# Patient Record
Sex: MALE | Race: WHITE | NOT HISPANIC OR LATINO | Employment: OTHER | URBAN - NONMETROPOLITAN AREA
[De-identification: names, ages, dates, MRNs, and addresses within clinical notes are randomized per-mention and may not be internally consistent; named-entity substitution may affect disease eponyms.]

---

## 2018-05-25 PROCEDURE — 99284 EMERGENCY DEPT VISIT MOD MDM: CPT

## 2018-05-25 PROCEDURE — 93005 ELECTROCARDIOGRAM TRACING: CPT

## 2018-05-25 PROCEDURE — 99284 EMERGENCY DEPT VISIT MOD MDM: CPT | Mod: Z6 | Performed by: FAMILY MEDICINE

## 2018-05-26 ENCOUNTER — APPOINTMENT (OUTPATIENT)
Dept: GENERAL RADIOLOGY | Facility: HOSPITAL | Age: 73
End: 2018-05-26
Attending: FAMILY MEDICINE
Payer: COMMERCIAL

## 2018-05-26 ENCOUNTER — TRANSFERRED RECORDS (OUTPATIENT)
Dept: HEALTH INFORMATION MANAGEMENT | Facility: CLINIC | Age: 73
End: 2018-05-26

## 2018-05-26 ENCOUNTER — HOSPITAL ENCOUNTER (EMERGENCY)
Facility: HOSPITAL | Age: 73
Discharge: HOME | End: 2018-05-26
Attending: FAMILY MEDICINE | Admitting: FAMILY MEDICINE
Payer: COMMERCIAL

## 2018-05-26 VITALS
TEMPERATURE: 97 F | RESPIRATION RATE: 14 BRPM | DIASTOLIC BLOOD PRESSURE: 75 MMHG | SYSTOLIC BLOOD PRESSURE: 145 MMHG | OXYGEN SATURATION: 95 % | HEART RATE: 88 BPM

## 2018-05-26 DIAGNOSIS — S82.201A TIBIA/FIBULA FRACTURE, RIGHT, CLOSED, INITIAL ENCOUNTER: ICD-10-CM

## 2018-05-26 DIAGNOSIS — S82.402A TIBIA/FIBULA FRACTURE, LEFT, CLOSED, INITIAL ENCOUNTER: ICD-10-CM

## 2018-05-26 DIAGNOSIS — S82.401A TIBIA/FIBULA FRACTURE, RIGHT, CLOSED, INITIAL ENCOUNTER: ICD-10-CM

## 2018-05-26 DIAGNOSIS — S82.202A TIBIA/FIBULA FRACTURE, LEFT, CLOSED, INITIAL ENCOUNTER: ICD-10-CM

## 2018-05-26 LAB
ANION GAP SERPL CALCULATED.3IONS-SCNC: 7 MMOL/L (ref 3–14)
BASOPHILS # BLD AUTO: 0.1 10E9/L (ref 0–0.2)
BASOPHILS NFR BLD AUTO: 0.6 %
BUN SERPL-MCNC: 21 MG/DL (ref 7–30)
CALCIUM SERPL-MCNC: 8.9 MG/DL (ref 8.5–10.1)
CHLORIDE SERPL-SCNC: 104 MMOL/L (ref 94–109)
CO2 SERPL-SCNC: 26 MMOL/L (ref 20–32)
CREAT SERPL-MCNC: 0.79 MG/DL (ref 0.66–1.25)
DIFFERENTIAL METHOD BLD: NORMAL
EOSINOPHIL # BLD AUTO: 0.3 10E9/L (ref 0–0.7)
EOSINOPHIL NFR BLD AUTO: 2.6 %
ERYTHROCYTE [DISTWIDTH] IN BLOOD BY AUTOMATED COUNT: 13.2 % (ref 10–15)
GFR SERPL CREATININE-BSD FRML MDRD: >90 ML/MIN/1.7M2
GLUCOSE SERPL-MCNC: 106 MG/DL (ref 70–99)
HCT VFR BLD AUTO: 45.5 % (ref 40–53)
HGB BLD-MCNC: 15.6 G/DL (ref 13.3–17.7)
IMM GRANULOCYTES # BLD: 0 10E9/L (ref 0–0.4)
IMM GRANULOCYTES NFR BLD: 0.4 %
LYMPHOCYTES # BLD AUTO: 1.8 10E9/L (ref 0.8–5.3)
LYMPHOCYTES NFR BLD AUTO: 19 %
MCH RBC QN AUTO: 33 PG (ref 26.5–33)
MCHC RBC AUTO-ENTMCNC: 34.3 G/DL (ref 31.5–36.5)
MCV RBC AUTO: 96 FL (ref 78–100)
MONOCYTES # BLD AUTO: 0.9 10E9/L (ref 0–1.3)
MONOCYTES NFR BLD AUTO: 9.5 %
NEUTROPHILS # BLD AUTO: 6.6 10E9/L (ref 1.6–8.3)
NEUTROPHILS NFR BLD AUTO: 67.9 %
NRBC # BLD AUTO: 0 10*3/UL
NRBC BLD AUTO-RTO: 0 /100
PLATELET # BLD AUTO: 156 10E9/L (ref 150–450)
POTASSIUM SERPL-SCNC: 4.4 MMOL/L (ref 3.4–5.3)
RBC # BLD AUTO: 4.73 10E12/L (ref 4.4–5.9)
SODIUM SERPL-SCNC: 137 MMOL/L (ref 133–144)
WBC # BLD AUTO: 9.7 10E9/L (ref 4–11)

## 2018-05-26 PROCEDURE — 93005 ELECTROCARDIOGRAM TRACING: CPT

## 2018-05-26 PROCEDURE — 36415 COLL VENOUS BLD VENIPUNCTURE: CPT | Performed by: FAMILY MEDICINE

## 2018-05-26 PROCEDURE — 73610 X-RAY EXAM OF ANKLE: CPT | Mod: 50

## 2018-05-26 PROCEDURE — 85025 COMPLETE CBC W/AUTO DIFF WBC: CPT | Performed by: FAMILY MEDICINE

## 2018-05-26 PROCEDURE — 93010 ELECTROCARDIOGRAM REPORT: CPT | Performed by: INTERNAL MEDICINE

## 2018-05-26 PROCEDURE — 80048 BASIC METABOLIC PNL TOTAL CA: CPT | Performed by: FAMILY MEDICINE

## 2018-05-26 PROCEDURE — 73590 X-RAY EXAM OF LOWER LEG: CPT | Mod: TC,LT

## 2018-05-26 PROCEDURE — 25000132 ZZH RX MED GY IP 250 OP 250 PS 637: Performed by: FAMILY MEDICINE

## 2018-05-26 RX ORDER — ALLOPURINOL 300 MG/1
300 TABLET ORAL DAILY
COMMUNITY
Start: 2018-02-22 | End: 2018-07-09

## 2018-05-26 RX ORDER — TRIAMCINOLONE ACETONIDE 1 MG/G
1 OINTMENT TOPICAL PRN
COMMUNITY
Start: 2018-02-22 | End: 2018-08-15

## 2018-05-26 RX ORDER — NAPROXEN 500 MG/1
500 TABLET ORAL PRN
COMMUNITY
Start: 2016-06-07

## 2018-05-26 RX ORDER — OXYCODONE HYDROCHLORIDE 5 MG/1
5 TABLET ORAL ONCE
Status: COMPLETED | OUTPATIENT
Start: 2018-05-26 | End: 2018-05-26

## 2018-05-26 RX ORDER — DIPHENOXYLATE HYDROCHLORIDE AND ATROPINE SULFATE 2.5; .025 MG/1; MG/1
1 TABLET ORAL DAILY
COMMUNITY

## 2018-05-26 RX ADMIN — OXYCODONE HYDROCHLORIDE 5 MG: 5 TABLET ORAL at 03:13

## 2018-05-26 ASSESSMENT — ENCOUNTER SYMPTOMS
NEUROLOGICAL NEGATIVE: 1
PSYCHIATRIC NEGATIVE: 1
GASTROINTESTINAL NEGATIVE: 1
CONSTITUTIONAL NEGATIVE: 1
HEMATOLOGIC/LYMPHATIC NEGATIVE: 1
RESPIRATORY NEGATIVE: 1
NECK PAIN: 0
CARDIOVASCULAR NEGATIVE: 1
MYALGIAS: 0
NECK STIFFNESS: 0
BACK PAIN: 0
JOINT SWELLING: 1

## 2018-05-26 NOTE — ED NOTES
Patient to room ED 11 via motorized wheelchair. Patient states that he was making dinner tonight. He elevated his wheelchair, and fell from it. He denies any head/neck injury or LOC. Patient C/O right ankle pain and left lower leg pain. He also states that he has been unable to transfer since the fall. Patient alert and oriented. CMS intact.

## 2018-05-26 NOTE — ED PROVIDER NOTES
History     Chief Complaint   Patient presents with     Ankle Pain     Patient fell forward out of wheelchair at 1830. Patietn denies any LOC or head/neck pain. C/O right ankle pain     Extremity Weakness     Patient unable to transfer since fall     HPI  Saurabh Cabrera is a 73 year old male who presents for leg injury . Patient had been making dinner . Adjusted his wheel chair up for dinner but then when he went to go down the ramp it fell forward. Neighbors had to come and get him back into the wheel chair.   Thinks he bumped his head but no LOC . NO headache. Having leg spasm.   Uses a catheter and self cathed bfore arrival .  Describes pain level 2/10 if not moving but increases to 10 when he develops leg spasms     Problem List:    There are no active problems to display for this patient.       Past Medical History:    No past medical history on file.    Past Surgical History:    No past surgical history on file.    Family History:    No family history on file.    Social History:  Marital Status:   [2]  Social History   Substance Use Topics     Smoking status: Not on file     Smokeless tobacco: Not on file     Alcohol use Not on file        Medications:      No current outpatient prescriptions on file.      Review of Systems   Constitutional: Negative.    HENT: Negative.    Respiratory: Negative.    Cardiovascular: Negative.    Gastrointestinal: Negative.    Genitourinary:        Self caths    Musculoskeletal: Positive for joint swelling. Negative for back pain, myalgias, neck pain and neck stiffness.        Right ankle and left lower leg  Pain    Neurological: Negative.    Hematological: Negative.    Psychiatric/Behavioral: Negative.    All other systems reviewed and are negative.      Physical Exam   BP: 146/80  Pulse: 107  Temp: 96.3  F (35.7  C)  Resp: 20  SpO2: 93 %      Physical Exam   Constitutional: He is oriented to person, place, and time. He appears well-developed and well-nourished. No  distress.   HENT:   Head: Normocephalic.   Eyes: Pupils are equal, round, and reactive to light.   Neck: Normal range of motion. Neck supple.   No  Posterior midline cervical spine tenderness    Cardiovascular: Normal rate and regular rhythm.  Exam reveals no gallop and no friction rub.    No murmur heard.  Pulmonary/Chest: Effort normal and breath sounds normal.   Abdominal: Soft.   Musculoskeletal:   Edema ecchymosis right medial and lateral malleolus. Left leg with ecchymosis tenderness mid shin . Warm extremities distal to injuries . Strong posterior tib pulses    Neurological: He is alert and oriented to person, place, and time.   Skin: Skin is warm. He is not diaphoretic.   Psychiatric: He has a normal mood and affect.   Nursing note and vitals reviewed.      ED Course     ED Course     Procedures          Patient presents to ER after falling forward from his wheel chair. Patient with complaint of bilateral lower extremity pain and inability to transfer. No neck pain No LOC . History and exam completed. Radiographs ordered confirming bilateral fractures including bimalleolar fracture of right ankle and distal tibia and proximal fibular fracture of left. Initially consulted with DR Szymanski orthopedics at our facility who felt patient care more appropriate at higher level of care. Patient discussed with DR Severson [facility] and patient will be transferred for definitive management of fractures. Patient and family in agreement with plan . Patient given oxycodone for pain control prior to transfer.         Results for orders placed or performed during the hospital encounter of 05/26/18   XR Ankle Right G/E 3 Views    Narrative    PROCEDURE:  XR ANKLE RT G/E 3 VW, XR ANKLE LT G/E 3 VW, XR TIBIA  & FIBULA LT 2 VW    HISTORY: injury; .    COMPARISON:  Left ankle 4/23/2009    TECHNIQUE:  3 views of the right ankle, 4 views of the left tibia and  fibula and 3 views of the left ankle.    FINDINGS:  Images of the right  ankle demonstrate an acute distal right  fibular fracture. Lucency traversing the base of the medial malleolus  is concerning for a nondisplaced fracture there as well. There are  diffuse degenerative changes in the hindfoot.     Images of the left tibia and fibula demonstrate oblique fractures of  the mid tibial diaphysis and proximal fibular metadiaphysis. The  distal tibial fragment is displaced medially up to 7.8 mm. The fibular  fracture is displaced laterally up to 4 mm. The bones are  osteoporotic.    Images of the left ankle redemonstrate the tibial fracture. There are  advanced degenerative changes of the left ankle.      Impression    IMPRESSION: Acute fractures of medial and lateral malleoli of the  right ankle as well as the left mid tibial diaphysis and proximal left  fibular metadiaphysis.      IVA CRUZ MD   XR Tibia & Fibula Left 2 Views    Narrative    PROCEDURE:  XR ANKLE RT G/E 3 VW, XR ANKLE LT G/E 3 VW, XR TIBIA  & FIBULA LT 2 VW    HISTORY: injury; .    COMPARISON:  Left ankle 4/23/2009    TECHNIQUE:  3 views of the right ankle, 4 views of the left tibia and  fibula and 3 views of the left ankle.    FINDINGS:  Images of the right ankle demonstrate an acute distal right  fibular fracture. Lucency traversing the base of the medial malleolus  is concerning for a nondisplaced fracture there as well. There are  diffuse degenerative changes in the hindfoot.     Images of the left tibia and fibula demonstrate oblique fractures of  the mid tibial diaphysis and proximal fibular metadiaphysis. The  distal tibial fragment is displaced medially up to 7.8 mm. The fibular  fracture is displaced laterally up to 4 mm. The bones are  osteoporotic.    Images of the left ankle redemonstrate the tibial fracture. There are  advanced degenerative changes of the left ankle.      Impression    IMPRESSION: Acute fractures of medial and lateral malleoli of the  right ankle as well as the left mid tibial  diaphysis and proximal left  fibular metadiaphysis.      IVA CRUZ MD   XR Ankle Left G/E 3 Views    Narrative    PROCEDURE:  XR ANKLE RT G/E 3 VW, XR ANKLE LT G/E 3 VW, XR TIBIA  & FIBULA LT 2 VW    HISTORY: injury; .    COMPARISON:  Left ankle 4/23/2009    TECHNIQUE:  3 views of the right ankle, 4 views of the left tibia and  fibula and 3 views of the left ankle.    FINDINGS:  Images of the right ankle demonstrate an acute distal right  fibular fracture. Lucency traversing the base of the medial malleolus  is concerning for a nondisplaced fracture there as well. There are  diffuse degenerative changes in the hindfoot.     Images of the left tibia and fibula demonstrate oblique fractures of  the mid tibial diaphysis and proximal fibular metadiaphysis. The  distal tibial fragment is displaced medially up to 7.8 mm. The fibular  fracture is displaced laterally up to 4 mm. The bones are  osteoporotic.    Images of the left ankle redemonstrate the tibial fracture. There are  advanced degenerative changes of the left ankle.      Impression    IMPRESSION: Acute fractures of medial and lateral malleoli of the  right ankle as well as the left mid tibial diaphysis and proximal left  fibular metadiaphysis.      IVA CRUZ MD   Basic metabolic panel   Result Value Ref Range    Sodium 137 133 - 144 mmol/L    Potassium 4.4 3.4 - 5.3 mmol/L    Chloride 104 94 - 109 mmol/L    Carbon Dioxide 26 20 - 32 mmol/L    Anion Gap 7 3 - 14 mmol/L    Glucose 106 (H) 70 - 99 mg/dL    Urea Nitrogen 21 7 - 30 mg/dL    Creatinine 0.79 0.66 - 1.25 mg/dL    GFR Estimate >90 >60 mL/min/1.7m2    GFR Estimate If Black >90 >60 mL/min/1.7m2    Calcium 8.9 8.5 - 10.1 mg/dL   CBC with platelets differential   Result Value Ref Range    WBC 9.7 4.0 - 11.0 10e9/L    RBC Count 4.73 4.4 - 5.9 10e12/L    Hemoglobin 15.6 13.3 - 17.7 g/dL    Hematocrit 45.5 40.0 - 53.0 %    MCV 96 78 - 100 fl    MCH 33.0 26.5 - 33.0 pg    MCHC 34.3 31.5 -  36.5 g/dL    RDW 13.2 10.0 - 15.0 %    Platelet Count 156 150 - 450 10e9/L    Diff Method Automated Method     % Neutrophils 67.9 %    % Lymphocytes 19.0 %    % Monocytes 9.5 %    % Eosinophils 2.6 %    % Basophils 0.6 %    % Immature Granulocytes 0.4 %    Nucleated RBCs 0 0 /100    Absolute Neutrophil 6.6 1.6 - 8.3 10e9/L    Absolute Lymphocytes 1.8 0.8 - 5.3 10e9/L    Absolute Monocytes 0.9 0.0 - 1.3 10e9/L    Absolute Eosinophils 0.3 0.0 - 0.7 10e9/L    Absolute Basophils 0.1 0.0 - 0.2 10e9/L    Abs Immature Granulocytes 0.0 0 - 0.4 10e9/L    Absolute Nucleated RBC 0.0          No results found for this or any previous visit (from the past 24 hour(s)).    Medications - No data to display    Assessments & Plan (with Medical Decision Making)     I have reviewed the nursing notes.    I have reviewed the findings, diagnosis, plan and need for follow up with the patient.      New Prescriptions    No medications on file       Final diagnoses:   Tibia/fibula fracture, left, closed, initial encounter   Tibia/fibula fracture, right, closed, initial encounter       5/25/2018   [facility]      Felicia Perera MD  05/26/18 1045

## 2018-06-14 ENCOUNTER — OFFICE VISIT (OUTPATIENT)
Dept: FAMILY MEDICINE | Facility: OTHER | Age: 73
End: 2018-06-14
Attending: FAMILY MEDICINE
Payer: COMMERCIAL

## 2018-06-14 VITALS — DIASTOLIC BLOOD PRESSURE: 68 MMHG | HEART RATE: 80 BPM | SYSTOLIC BLOOD PRESSURE: 122 MMHG

## 2018-06-14 DIAGNOSIS — M62.838 MUSCLE SPASM: Primary | ICD-10-CM

## 2018-06-14 DIAGNOSIS — R33.9 URINARY RETENTION: ICD-10-CM

## 2018-06-14 DIAGNOSIS — S82.402D CLOSED FRACTURE OF LEFT TIBIA AND FIBULA WITH ROUTINE HEALING: ICD-10-CM

## 2018-06-14 DIAGNOSIS — R35.0 URINARY FREQUENCY: ICD-10-CM

## 2018-06-14 DIAGNOSIS — S82.841D CLOSED BIMALLEOLAR FRACTURE OF RIGHT ANKLE WITH ROUTINE HEALING: ICD-10-CM

## 2018-06-14 DIAGNOSIS — S82.202D CLOSED FRACTURE OF LEFT TIBIA AND FIBULA WITH ROUTINE HEALING: ICD-10-CM

## 2018-06-14 LAB
ALBUMIN UR-MCNC: ABNORMAL MG/DL
APPEARANCE UR: ABNORMAL
BACTERIA #/AREA URNS HPF: ABNORMAL /HPF
BILIRUB UR QL STRIP: NEGATIVE
COLOR UR AUTO: YELLOW
GLUCOSE UR STRIP-MCNC: NEGATIVE MG/DL
HGB UR QL STRIP: ABNORMAL
KETONES UR STRIP-MCNC: NEGATIVE MG/DL
LEUKOCYTE ESTERASE UR QL STRIP: ABNORMAL
NITRATE UR QL: POSITIVE
PH UR STRIP: 5.5 PH (ref 5–7)
RBC #/AREA URNS AUTO: ABNORMAL /HPF
SOURCE: ABNORMAL
SP GR UR STRIP: >1.03 (ref 1–1.03)
UROBILINOGEN UR STRIP-ACNC: 0.2 EU/DL (ref 0.2–1)
WBC #/AREA URNS AUTO: ABNORMAL /HPF

## 2018-06-14 PROCEDURE — 81001 URINALYSIS AUTO W/SCOPE: CPT | Performed by: FAMILY MEDICINE

## 2018-06-14 PROCEDURE — 87086 URINE CULTURE/COLONY COUNT: CPT | Performed by: FAMILY MEDICINE

## 2018-06-14 PROCEDURE — 99205 OFFICE O/P NEW HI 60 MIN: CPT | Performed by: FAMILY MEDICINE

## 2018-06-14 PROCEDURE — 87088 URINE BACTERIA CULTURE: CPT | Performed by: FAMILY MEDICINE

## 2018-06-14 RX ORDER — CYCLOBENZAPRINE HCL 10 MG
10 TABLET ORAL 3 TIMES DAILY PRN
Qty: 30 TABLET | Refills: 1 | Status: SHIPPED | OUTPATIENT
Start: 2018-06-14 | End: 2018-07-09

## 2018-06-14 ASSESSMENT — PAIN SCALES - GENERAL: PAINLEVEL: MILD PAIN (3)

## 2018-06-14 NOTE — NURSING NOTE
Patient here to establish care, He is at [facility] follow up. He is from the [facility] [place] [facility].  He has 2 broken legs one with 48 staples. [facility]o follow up. Tressa Schmitt LPN .......................6/14/2018  4:18 PM

## 2018-06-14 NOTE — MR AVS SNAPSHOT
"              After Visit Summary   6/14/2018    Saurabh Cabrera    MRN: [id number]           Patient Information     Date Of Birth          1945        Visit Information        Provider Department      6/14/2018 4:15 PM Joselo Marley MD [facility]        Today's Diagnoses     Muscle spasm    -  1    Urinary frequency        Urinary retention        Closed fracture of left tibia and fibula with routine healing        Closed bimalleolar fracture of right ankle with routine healing           Follow-ups after your visit        Who to contact     If you have questions or need follow up information about today's clinic visit or your schedule please contact [facility] directly at [phone].  Normal or non-critical lab and imaging results will be communicated to you by [employer], letter or phone within 4 business days after the clinic has received the results. If you do not hear from us within 7 days, please contact the clinic through [employer] or phone. If you have a critical or abnormal lab result, we will notify you by phone as soon as possible.  Submit refill requests through [facility] or call your pharmacy and they will forward the refill request to us. Please allow 3 business days for your refill to be completed.          Additional Information About Your Visit        MyChart Information     [facility] lets you send messages to your doctor, view your test results, renew your prescriptions, schedule appointments and more. To sign up, go to [email or web address] . Click on \"Log in\" on the left side of the screen, which will take you to the Welcome page. Then click on \"Sign up Now\" on the right side of the page.     You will be asked to enter the access code listed below, as well as some personal information. Please follow the directions to create your username and password.     Your access code is: [email or web address]  Expires: 9/13/2018  8:02 AM     Your access code will "  in 90 days. If you need help or a new code, please call your [place] clinic or [phone].        Care EveryWhere ID     This is your Care EveryWhere ID. This could be used by other organizations to access your [place] medical records  [id number]        Your Vitals Were     Pulse                   80            Blood Pressure from Last 3 Encounters:   18 122/68   18 145/75    Weight from Last 3 Encounters:   No data found for Wt              We Performed the Following     Urinalysis w Reflex Microscopic If Positive     Urine Culture Aerobic Bacterial     Urine Microscopic          Today's Medication Changes          These changes are accurate as of 18 11:59 PM.  If you have any questions, ask your nurse or doctor.               Start taking these medicines.        Dose/Directions    cyclobenzaprine 10 MG tablet   Commonly known as:  FLEXERIL   Used for:  Muscle spasm   Started by:  Joselo Marley MD        Dose:  10 mg   Take 1 tablet (10 mg) by mouth 3 times daily as needed for muscle spasms   Quantity:  30 tablet   Refills:  1            Where to get your medicines      Call your pharmacy to confirm that your medication is ready for pickup. It may take up to 24 hours for them to receive the prescription. If the prescription is not ready within 3 business days, please contact your clinic or your provider.     We will let you know when these medications are ready. If you don't hear back within 3 business days, please contact us.     cyclobenzaprine 10 MG tablet                Primary Care Provider Office Phone # Fax #    Kiran Inocencio [phone]       [facility] [address] [place] [address]        Equal Access to Services     [facility] AH: Alexa hernandez [employer], waaxda luqadaha, qaybta kaalmada lou, suzi robles. So [facility] [phone].    ATENCIÓN: Si habla español, tiene a bal disposición servicios  bautista de asistencia lingüística. Barbara theodore [phone].    We comply with applicable federal civil rights laws and [place] laws. We do not discriminate on the basis of race, color, national origin, age, disability, sex, sexual orientation, or gender identity.            Thank you!     Thank you for choosing [facility] AND [facility]  for your care. Our goal is always to provide you with excellent care. Hearing back from our patients is one way we can continue to improve our services. Please take a few minutes to complete the written survey that you may receive in the mail after your visit with us. Thank you!             Your Updated Medication List - Protect others around you: Learn how to safely use, store and throw away your medicines at www.disposemymeds.org.          This list is accurate as of 6/14/18 11:59 PM.  Always use your most recent med list.                   Brand Name Dispense Instructions for use Diagnosis    allopurinol 300 MG tablet    ZYLOPRIM     Take 300 mg by mouth daily        CYANOCOBALAMIN PO      Take 1,000 mcg by mouth daily        cyclobenzaprine 10 MG tablet    FLEXERIL    30 tablet    Take 1 tablet (10 mg) by mouth 3 times daily as needed for muscle spasms    Muscle spasm       FISH OIL PO      Take 1 capsule by mouth        MULTI-VITAMINS Tabs      Take 1 tablet by mouth daily        naproxen 500 MG tablet    NAPROSYN     Take 500 mg by mouth as needed        triamcinolone 0.1 % ointment    KENALOG     Apply 1 Film topically as needed        VITAMIN D-1000 MAX ST 1000 units Tabs   Generic drug:  cholecalciferol      Take 1,000 Units by mouth

## 2018-06-15 PROBLEM — Z96.653 STATUS POST TOTAL BILATERAL KNEE REPLACEMENT: Status: ACTIVE | Noted: 2018-06-15

## 2018-06-15 PROBLEM — S82.841D CLOSED BIMALLEOLAR FRACTURE OF RIGHT ANKLE WITH ROUTINE HEALING: Status: ACTIVE | Noted: 2018-06-15

## 2018-06-15 PROBLEM — R33.9 URINARY RETENTION: Status: ACTIVE | Noted: 2018-06-15

## 2018-06-15 PROBLEM — S82.402D CLOSED FRACTURE OF LEFT TIBIA AND FIBULA WITH ROUTINE HEALING: Status: ACTIVE | Noted: 2018-06-15

## 2018-06-15 PROBLEM — S82.202D CLOSED FRACTURE OF LEFT TIBIA AND FIBULA WITH ROUTINE HEALING: Status: ACTIVE | Noted: 2018-06-15

## 2018-06-15 PROBLEM — Z98.84 STATUS POST BARIATRIC SURGERY: Status: ACTIVE | Noted: 2018-06-15

## 2018-06-15 PROBLEM — I67.1 NONRUPTURED CEREBRAL ANEURYSM: Status: ACTIVE | Noted: 2018-06-15

## 2018-06-15 PROBLEM — R29.898 WEAKNESS OF BOTH LOWER EXTREMITIES: Status: ACTIVE | Noted: 2018-06-15

## 2018-06-15 PROBLEM — M1A.09X0 CHRONIC IDIOPATHIC GOUT OF MULTIPLE SITES: Status: ACTIVE | Noted: 2018-06-15

## 2018-06-15 ASSESSMENT — ENCOUNTER SYMPTOMS
BACK PAIN: 1
CHILLS: 0
DIZZINESS: 0
RESPIRATORY NEGATIVE: 1
DYSURIA: 1
ACTIVITY CHANGE: 1
EYES NEGATIVE: 1
WEAKNESS: 1
FEVER: 0
MYALGIAS: 1
PSYCHIATRIC NEGATIVE: 1
FATIGUE: 0
FREQUENCY: 1

## 2018-06-15 NOTE — PROGRESS NOTES
SUBJECTIVE:   Saurabh Cabrera is a 73 year old male who presents to clinic today for the following health issues: Staple removal establish care.    HPI Comments: Patient arrives here to establish care.  And staple removal.  States 17 days ago he had fallen subsequently fracturing both lower legs.  Chart indicates he has a fracture of the left tibia.  And right bimalleolar fracture of the right lower extremity.  He subsequently underwent surgery.  Tuesday he was supposed to have staple removal.  States that he was 14 days postop but both he and his wife and the clinic forgot to remove the  staples his next appointment is 4 weeks from now.  Patient was in his wheelchair when he lost his balance and fell forward.  The chair fell backwards.  Sustaining multiple injuries including the bilateral lower extremity fractures.  His right leg is casted.  His left leg is in a boot.  Patient also complained of urinary frequency.  Some mild dysuria.  He does cath himself on a regular basis.        Patient Active Problem List    Diagnosis Date Noted     Urinary retention 06/15/2018     Priority: Medium     Chronic idiopathic gout of multiple sites 06/15/2018     Priority: Medium     Status post bariatric surgery 06/15/2018     Priority: Medium     Status post total bilateral knee replacement 06/15/2018     Priority: Medium     Closed fracture of left tibia and fibula with routine healing 06/15/2018     Priority: Medium     Closed bimalleolar fracture of right ankle with routine healing 06/15/2018     Priority: Medium     Nonruptured cerebral aneurysm status post repair 06/15/2018     Priority: Medium     Weakness of both lower extremities secondary to aneurysm repair 06/15/2018     Priority: Medium     Urinary frequency 06/14/2018     Priority: Medium     Muscle spasm 06/14/2018     Priority: Medium     No past medical history on file.   No past surgical history on file.  Social History     Social History Narrative     No  narrative on file     Current Outpatient Prescriptions   Medication Sig Dispense Refill     allopurinol (ZYLOPRIM) 300 MG tablet Take 300 mg by mouth daily       cholecalciferol (VITAMIN D-1000 MAX ST) 1000 units TABS Take 1,000 Units by mouth       CYANOCOBALAMIN PO Take 1,000 mcg by mouth daily       cyclobenzaprine (FLEXERIL) 10 MG tablet Take 1 tablet (10 mg) by mouth 3 times daily as needed for muscle spasms 30 tablet 1     Multiple Vitamin (MULTI-VITAMINS) TABS Take 1 tablet by mouth daily       naproxen (NAPROSYN) 500 MG tablet Take 500 mg by mouth as needed       Omega-3 Fatty Acids (FISH OIL PO) Take 1 capsule by mouth       triamcinolone (KENALOG) 0.1 % ointment Apply 1 Film topically as needed       Allergies   Allergen Reactions     Atorvastatin      DIARRHEA     Ci Pigment Blue 63      Other reaction(s): Other - Describe In Comment Field  Dye from MRI's causes weakness.  Also, Needs Low Dose from MRI's  r/t clamped off vein in neck.      Ciprofloxacin      Other reaction(s): Other - Describe In Comment Field  Weakness in muscles.     Gadoteridol Nausea and Vomiting     Experienced nausea/vomiting after injection of Prohance during his MRI at the [facility] on 8/7/13     Rofecoxib      MEMORY LOSS       Review of Systems   Constitutional: Positive for activity change. Negative for chills, fatigue and fever.   Eyes: Negative.    Respiratory: Negative.    Cardiovascular: Negative for chest pain.   Genitourinary: Positive for dysuria and frequency.   Musculoskeletal: Positive for back pain and myalgias.   Neurological: Positive for weakness. Negative for dizziness.        Patient report muscle spasms in the left leg.   Psychiatric/Behavioral: Negative.         OBJECTIVE:     /68 (BP Location: Right arm, Patient Position: Sitting)  Pulse 80  There is no height or weight on file to calculate BMI.  Physical Exam   Constitutional: He appears well-developed.   Obese   HENT:   Head: Normocephalic and  atraumatic.   Cardiovascular: Normal rate and regular rhythm.    Pulmonary/Chest: Effort normal and breath sounds normal.   Abdominal: Soft.   Musculoskeletal:   Left lower extremity is in a boot which was removed.  He has a surgical incision down the anterior portion of the lower leg with staples.  No signs of secondary infection.  Moderate swelling present.  Approximately 50 staples present        Results for orders placed or performed in visit on 06/14/18   Urinalysis w Reflex Microscopic If Positive   Result Value Ref Range    Color Urine Yellow     Appearance Urine Cloudy     Glucose Urine Negative NEG^Negative mg/dL    Bilirubin Urine Negative NEG^Negative    Ketones Urine Negative NEG^Negative mg/dL    Specific Gravity Urine >1.030 1.003 - 1.035    Blood Urine Trace (A) NEG^Negative    pH Urine 5.5 5.0 - 7.0 pH    Protein Albumin Urine Trace (A) NEG^Negative mg/dL    Urobilinogen Urine 0.2 0.2 - 1.0 EU/dL    Nitrite Urine Positive (A) NEG^Negative    Leukocyte Esterase Urine Large (A) NEG^Negative    Source Midstream Urine    Urine Microscopic   Result Value Ref Range    WBC Urine  (A) OTO5^0 - 5 /HPF    RBC Urine 2-5 (A) OTO2^O - 2 /HPF    Bacteria Urine Many (A) NEG^Negative /HPF         ASSESSMENT/PLAN:         1. Muscle spasm  Start  - cyclobenzaprine (FLEXERIL) 10 MG tablet; Take 1 tablet (10 mg) by mouth 3 times daily as needed for muscle spasms  Dispense: 30 tablet; Refill: 1  - Urine Microscopic    2. Urinary frequency urine positive for UTI.  Because of the symptoms will start  Start the patient on Bactrim DS.  ×7 days.  Orders left with the nursing home.  - Urinalysis w Reflex Microscopic If Positive  - Urine Culture Aerobic Bacterial    3. Urinary retention  Chronic.    4. Closed fracture of left tibia and fibula with routine healing  Q quite a bit of swelling present in the incisional wound.  Will remove half the staples and allow more tension on the surgical wound.  Follow-up in 1 week for  removal of the rest of the staples.    5. Closed bimalleolar fracture of right ankle with routine healing    Continue with nursing home placement until able to go home.  Per orthopedic surgery in 4 weeks as scheduled.        Joselo Marley MD  [facility]

## 2018-06-17 LAB
BACTERIA SPEC CULT: ABNORMAL
SPECIMEN SOURCE: ABNORMAL

## 2018-06-22 ENCOUNTER — OFFICE VISIT (OUTPATIENT)
Dept: FAMILY MEDICINE | Facility: OTHER | Age: 73
End: 2018-06-22
Attending: FAMILY MEDICINE
Payer: COMMERCIAL

## 2018-06-22 VITALS — SYSTOLIC BLOOD PRESSURE: 118 MMHG | DIASTOLIC BLOOD PRESSURE: 68 MMHG | HEART RATE: 100 BPM | RESPIRATION RATE: 20 BRPM

## 2018-06-22 DIAGNOSIS — S82.402D CLOSED FRACTURE OF LEFT TIBIA AND FIBULA WITH ROUTINE HEALING: Primary | ICD-10-CM

## 2018-06-22 DIAGNOSIS — S82.202D CLOSED FRACTURE OF LEFT TIBIA AND FIBULA WITH ROUTINE HEALING: Primary | ICD-10-CM

## 2018-06-22 PROCEDURE — G0463 HOSPITAL OUTPT CLINIC VISIT: HCPCS

## 2018-06-22 PROCEDURE — 99213 OFFICE O/P EST LOW 20 MIN: CPT | Performed by: FAMILY MEDICINE

## 2018-06-22 RX ORDER — AMOXICILLIN 500 MG/1
4 CAPSULE ORAL
COMMUNITY
Start: 2017-06-02 | End: 2018-07-09

## 2018-06-22 NOTE — NURSING NOTE
Patient here for recheck on left leg after staple removal, he finished antibiotics this morning for UTI. Tressa Schmitt LPN .......................6/22/2018  10:00 AM

## 2018-06-22 NOTE — MR AVS SNAPSHOT
"              After Visit Summary   2018    Saurbah Cabrera    MRN: [id number]           Patient Information     Date Of Birth          1945        Visit Information        Provider Department      2018 10:00 AM Joselo Marley MD [facility]        Today's Diagnoses     Closed fracture of left tibia and fibula with routine healing    -  1       Follow-ups after your visit        Who to contact     If you have questions or need follow up information about today's clinic visit or your schedule please contact [facility] directly at [phone].  Normal or non-critical lab and imaging results will be communicated to you by Bablichart, letter or phone within 4 business days after the clinic has received the results. If you do not hear from us within 7 days, please contact the clinic through [employer] or phone. If you have a critical or abnormal lab result, we will notify you by phone as soon as possible.  Submit refill requests through [employer] or call your pharmacy and they will forward the refill request to us. Please allow 3 business days for your refill to be completed.          Additional Information About Your Visit        MyChart Information     [employer] lets you send messages to your doctor, view your test results, renew your prescriptions, schedule appointments and more. To sign up, go to [email or web address] . Click on \"Log in\" on the left side of the screen, which will take you to the Welcome page. Then click on \"Sign up Now\" on the right side of the page.     You will be asked to enter the access code listed below, as well as some personal information. Please follow the directions to create your username and password.     Your access code is: [email or web address]  Expires: 2018  8:02 AM     Your access code will  in 90 days. If you need help or a new code, please call your [place] clinic or [phone].        Care EveryWhere ID     This is your " Care EveryWhere ID. This could be used by other organizations to access your [facility] medical records  [id number]        Your Vitals Were     Pulse Respirations                100 20           Blood Pressure from Last 3 Encounters:   06/22/18 118/68   06/14/18 122/68   05/26/18 145/75    Weight from Last 3 Encounters:   No data found for Wt              Today, you had the following     No orders found for display       Primary Care Provider Office Phone # Fax #    Kiran Painter [phone]       [facility] [address] [place] [address]        Equal Access to Services     [facility]: Hadii aad ku hadasho Soomaali, waaxda luqadaha, qaybta kaalmada adeegyada, suzi britton haymp saxena . So [facility] [phone].    ATENCIÓN: Si habla español, tiene a bal disposición servicios gratuitos de asistencia lingüística. Ll[facility] [phone].    We comply with applicable federal civil rights laws and [place] laws. We do not discriminate on the basis of race, color, national origin, age, disability, sex, sexual orientation, or gender identity.            Thank you!     Thank you for choosing [facility] AND [facility]  for your care. Our goal is always to provide you with excellent care. Hearing back from our patients is one way we can continue to improve our services. Please take a few minutes to complete the written survey that you may receive in the mail after your visit with us. Thank you!             Your Updated Medication List - Protect others around you: Learn how to safely use, store and throw away your medicines at www.disposemymeds.org.          This list is accurate as of 6/22/18 11:59 PM.  Always use your most recent med list.                   Brand Name Dispense Instructions for use Diagnosis    allopurinol 300 MG tablet    ZYLOPRIM     Take 300 mg by mouth daily        amoxicillin 500 MG capsule    AMOXIL     Take 4 capsules by mouth        Catheters  "Misc      As directed. 12-18FR, 13.2\", For home use, use 5x/day, 150 per month        CYANOCOBALAMIN PO      Take 1,000 mcg by mouth daily        cyclobenzaprine 10 MG tablet    FLEXERIL    30 tablet    Take 1 tablet (10 mg) by mouth 3 times daily as needed for muscle spasms    Muscle spasm       FISH OIL PO      Take 1 capsule by mouth        methotrexate 2.5 MG tablet CHEMO           MULTI-VITAMINS Tabs      Take 1 tablet by mouth daily        naproxen 500 MG tablet    NAPROSYN     Take 500 mg by mouth as needed        triamcinolone 0.1 % ointment    KENALOG     Apply 1 Film topically as needed        VITAMIN D-1000 MAX ST 1000 units Tabs   Generic drug:  cholecalciferol      Take 1,000 Units by mouth          "

## 2018-06-25 NOTE — PROGRESS NOTES
"  SUBJECTIVE:   Saurabh Cabrera is a 73 year old male who presents to clinic today for the following health issues: staple removal    HPI Comments: Patient arrives here for follow-up of staple removal.  He finishes antibiotics this morning.  He had have his staples removed last week.  He is doing very well.        Patient Active Problem List    Diagnosis Date Noted     Urinary retention 06/15/2018     Priority: Medium     Chronic idiopathic gout of multiple sites 06/15/2018     Priority: Medium     Status post bariatric surgery 06/15/2018     Priority: Medium     Status post total bilateral knee replacement 06/15/2018     Priority: Medium     Closed fracture of left tibia and fibula with routine healing 06/15/2018     Priority: Medium     Closed bimalleolar fracture of right ankle with routine healing 06/15/2018     Priority: Medium     Nonruptured cerebral aneurysm status post repair 06/15/2018     Priority: Medium     Weakness of both lower extremities secondary to aneurysm repair 06/15/2018     Priority: Medium     Urinary frequency 06/14/2018     Priority: Medium     Muscle spasm 06/14/2018     Priority: Medium     No past medical history on file.   No past surgical history on file.  Current Outpatient Prescriptions   Medication Sig Dispense Refill     allopurinol (ZYLOPRIM) 300 MG tablet Take 300 mg by mouth daily       amoxicillin (AMOXIL) 500 MG capsule Take 4 capsules by mouth       Catheters MISC As directed. 12-18FR, 13.2\", For home use, use 5x/day, 150 per month       cholecalciferol (VITAMIN D-1000 MAX ST) 1000 units TABS Take 1,000 Units by mouth       CYANOCOBALAMIN PO Take 1,000 mcg by mouth daily       cyclobenzaprine (FLEXERIL) 10 MG tablet Take 1 tablet (10 mg) by mouth 3 times daily as needed for muscle spasms 30 tablet 1     methotrexate 2.5 MG tablet CHEMO        Multiple Vitamin (MULTI-VITAMINS) TABS Take 1 tablet by mouth daily       naproxen (NAPROSYN) 500 MG tablet Take 500 mg by mouth as " needed       Omega-3 Fatty Acids (FISH OIL PO) Take 1 capsule by mouth       triamcinolone (KENALOG) 0.1 % ointment Apply 1 Film topically as needed       Allergies   Allergen Reactions     Atorvastatin      DIARRHEA     Ci Pigment Blue 63      Other reaction(s): Other - Describe In Comment Field  Dye from MRI's causes weakness.  Also, Needs Low Dose from MRI's  r/t clamped off vein in neck.      Ciprofloxacin      Other reaction(s): Other - Describe In Comment Field  Weakness in muscles.     Gadoteridol Nausea and Vomiting     Experienced nausea/vomiting after injection of Prohance during his MRI at the [facility] on 8/7/13     Rofecoxib      MEMORY LOSS       Review of Systems     OBJECTIVE:     /68 (BP Location: Right arm, Patient Position: Sitting)  Pulse 100  Resp 20  There is no height or weight on file to calculate BMI.  Physical Exam   Constitutional: He appears well-developed.   Abdominal: Soft.   Skin:   Wound looks to be healing well.  No signs of secondary infection.       none     ASSESSMENT/PLAN:         1. Closed fracture of left tibia and fibula with routine healing  Staples removed without any difficulty.  Steri-Strips applied.      Joselo Marley MD  [facility]

## 2018-06-26 ENCOUNTER — TELEPHONE (OUTPATIENT)
Dept: FAMILY MEDICINE | Facility: OTHER | Age: 73
End: 2018-06-26

## 2018-06-27 NOTE — TELEPHONE ENCOUNTER
Order relayed to Jobi nurse at [facility]. Tressa Schmitt LPN .......................6/27/2018  8:57 AM

## 2018-07-03 ENCOUNTER — TELEPHONE (OUTPATIENT)
Dept: FAMILY MEDICINE | Facility: OTHER | Age: 73
End: 2018-07-03

## 2018-07-05 PROCEDURE — G0180 MD CERTIFICATION HHA PATIENT: HCPCS | Performed by: FAMILY MEDICINE

## 2018-07-06 ENCOUNTER — TELEPHONE (OUTPATIENT)
Dept: FAMILY MEDICINE | Facility: OTHER | Age: 73
End: 2018-07-06

## 2018-07-06 NOTE — TELEPHONE ENCOUNTER
Requesting verbal orders for start of skilled nursing care 1 time a week for 3 weeks. Also orders for physical and occupational therapy to evaluate.

## 2018-07-09 ENCOUNTER — OFFICE VISIT (OUTPATIENT)
Dept: FAMILY MEDICINE | Facility: OTHER | Age: 73
End: 2018-07-09
Attending: FAMILY MEDICINE
Payer: COMMERCIAL

## 2018-07-09 VITALS — HEART RATE: 84 BPM | DIASTOLIC BLOOD PRESSURE: 64 MMHG | SYSTOLIC BLOOD PRESSURE: 124 MMHG

## 2018-07-09 DIAGNOSIS — S82.841D CLOSED BIMALLEOLAR FRACTURE OF RIGHT ANKLE WITH ROUTINE HEALING: ICD-10-CM

## 2018-07-09 DIAGNOSIS — R33.9 URINARY RETENTION: ICD-10-CM

## 2018-07-09 DIAGNOSIS — R30.0 DYSURIA: ICD-10-CM

## 2018-07-09 DIAGNOSIS — S82.202D CLOSED FRACTURE OF LEFT TIBIA AND FIBULA WITH ROUTINE HEALING: ICD-10-CM

## 2018-07-09 DIAGNOSIS — M10.9 ACUTE GOUT, UNSPECIFIED CAUSE, UNSPECIFIED SITE: Primary | ICD-10-CM

## 2018-07-09 DIAGNOSIS — M62.838 MUSCLE SPASM: ICD-10-CM

## 2018-07-09 DIAGNOSIS — S82.402D CLOSED FRACTURE OF LEFT TIBIA AND FIBULA WITH ROUTINE HEALING: ICD-10-CM

## 2018-07-09 PROBLEM — M1A.09X0 CHRONIC IDIOPATHIC GOUT OF MULTIPLE SITES: Status: RESOLVED | Noted: 2018-06-15 | Resolved: 2018-07-09

## 2018-07-09 LAB
ALBUMIN UR-MCNC: ABNORMAL MG/DL
APPEARANCE UR: ABNORMAL
BACTERIA #/AREA URNS HPF: ABNORMAL /HPF
BILIRUB UR QL STRIP: NEGATIVE
COLOR UR AUTO: YELLOW
GLUCOSE UR STRIP-MCNC: NEGATIVE MG/DL
HGB UR QL STRIP: ABNORMAL
KETONES UR STRIP-MCNC: ABNORMAL MG/DL
LEUKOCYTE ESTERASE UR QL STRIP: ABNORMAL
MUCOUS THREADS #/AREA URNS LPF: PRESENT /LPF
NITRATE UR QL: POSITIVE
NON-SQ EPI CELLS #/AREA URNS LPF: ABNORMAL /LPF
PH UR STRIP: 5.5 PH (ref 5–7)
RBC #/AREA URNS AUTO: ABNORMAL /HPF
SOURCE: ABNORMAL
SP GR UR STRIP: >1.03 (ref 1–1.03)
UROBILINOGEN UR STRIP-ACNC: 0.2 EU/DL (ref 0.2–1)
WBC #/AREA URNS AUTO: >100 /HPF

## 2018-07-09 PROCEDURE — 81001 URINALYSIS AUTO W/SCOPE: CPT | Performed by: FAMILY MEDICINE

## 2018-07-09 PROCEDURE — 87086 URINE CULTURE/COLONY COUNT: CPT | Performed by: FAMILY MEDICINE

## 2018-07-09 PROCEDURE — 87088 URINE BACTERIA CULTURE: CPT | Performed by: FAMILY MEDICINE

## 2018-07-09 PROCEDURE — 99214 OFFICE O/P EST MOD 30 MIN: CPT | Performed by: FAMILY MEDICINE

## 2018-07-09 RX ORDER — ALLOPURINOL 300 MG/1
300 TABLET ORAL DAILY
Qty: 90 TABLET | Refills: 3 | Status: SHIPPED | OUTPATIENT
Start: 2018-07-09

## 2018-07-09 RX ORDER — NITROFURANTOIN 25; 75 MG/1; MG/1
100 CAPSULE ORAL 2 TIMES DAILY
Qty: 14 CAPSULE | Refills: 0 | Status: SHIPPED | OUTPATIENT
Start: 2018-07-09 | End: 2018-08-08

## 2018-07-09 RX ORDER — CYCLOBENZAPRINE HCL 10 MG
10 TABLET ORAL 3 TIMES DAILY PRN
Qty: 30 TABLET | Refills: 11 | Status: SHIPPED | OUTPATIENT
Start: 2018-07-09

## 2018-07-09 ASSESSMENT — PAIN SCALES - GENERAL: PAINLEVEL: NO PAIN (0)

## 2018-07-09 NOTE — MR AVS SNAPSHOT
"              After Visit Summary   7/9/2018    Saurabh Cabrera    MRN: [id number]           Patient Information     Date Of Birth          1945        Visit Information        Provider Department      7/9/2018 3:45 PM Joselo Marley MD [facility]        Today's Diagnoses     Acute gout, unspecified cause, unspecified site    -  1    Muscle spasm        Urinary retention        Dysuria        Closed fracture of left tibia and fibula with routine healing        Closed bimalleolar fracture of right ankle with routine healing           Follow-ups after your visit        Who to contact     If you have questions or need follow up information about today's clinic visit or your schedule please contact [facility] directly at [phone].  Normal or non-critical lab and imaging results will be communicated to you by [employer] Camp[employer], letter or phone within 4 business days after the clinic has received the results. If you do not hear from us within 7 days, please contact the clinic through [employer] or phone. If you have a critical or abnormal lab result, we will notify you by phone as soon as possible.  Submit refill requests through [employer] or call your pharmacy and they will forward the refill request to us. Please allow 3 business days for your refill to be completed.          Additional Information About Your Visit        MyChart Information     [employer] lets you send messages to your doctor, view your test results, renew your prescriptions, schedule appointments and more. To sign up, go to [email or web address] . Click on \"Log in\" on the left side of the screen, which will take you to the Welcome page. Then click on \"Sign up Now\" on the right side of the page.     You will be asked to enter the access code listed below, as well as some personal information. Please follow the directions to create your username and password.     Your access code is: [email or web address]  Expires: " 2018  8:02 AM     Your access code will  in 90 days. If you need help or a new code, please call your [place] clinic or [phone].        Care EveryWhere ID     This is your Care EveryWhere ID. This could be used by other organizations to access your [place] medical records  [id number]        Your Vitals Were     Pulse                   84            Blood Pressure from Last 3 Encounters:   18 124/64   18 118/68   18 122/68    Weight from Last 3 Encounters:   No data found for Wt              We Performed the Following     Urinalysis w Reflex Microscopic If Positive     Urine Culture Aerobic Bacterial     Urine Microscopic          Today's Medication Changes          These changes are accurate as of 18 11:59 PM.  If you have any questions, ask your nurse or doctor.               Start taking these medicines.        Dose/Directions    nitroFURantoin (macrocrystal-monohydrate) 100 MG capsule   Commonly known as:  MACROBID   Used for:  Dysuria        Dose:  100 mg   Take 1 capsule (100 mg) by mouth 2 times daily   Quantity:  14 capsule   Refills:  0         Stop taking these medicines if you haven't already. Please contact your care team if you have questions.     amoxicillin 500 MG capsule   Commonly known as:  AMOXIL                Where to get your medicines      These medications were sent to [facility] [address] [address] [facility] [address]  [address] [facility] [address]     Phone:  [phone]     allopurinol 300 MG tablet    cyclobenzaprine 10 MG tablet    nitroFURantoin (macrocrystal-monohydrate) 100 MG capsule               Information about OPIOIDS     PRESCRIPTION OPIOIDS: WHAT YOU NEED TO KNOW   We gave you an opioid (narcotic) pain medicine. It is important to manage your pain, but opioids are not always the best choice. You should first try all the other options your care team gave you. Take this medicine for as short a time  (and as few doses) as possible.     These medicines have risks:    DO NOT drive when on new or higher doses of pain medicine. These medicines can affect your alertness and reaction times, and you could be arrested for driving under the influence (DUI). If you need to use opioids long-term, talk to your care team about driving.    DO NOT operate heave machinery    DO NOT do any other dangerous activities while taking these medicines.     DO NOT drink any alcohol while taking these medicines.      If the opioid prescribed includes acetaminophen, DO NOT take with any other medicines that contain acetaminophen. Read all labels carefully. Look for the word  acetaminophen  or  Tylenol.  Ask your pharmacist if you have questions or are unsure.    You can get addicted to pain medicines, especially if you have a history of addiction (chemical, alcohol or substance dependence). Talk to your care team about ways to reduce this risk.    Store your pills in a secure place, locked if possible. We will not replace any lost or stolen medicine. If you don t finish your medicine, please throw away (dispose) as directed by your pharmacist. The [place] Pollution Control Agency has more information about safe disposal: https://www.[email or web address].[place].us/living-green/managing-unwanted-medications.     All opioids tend to cause constipation. Drink plenty of water and eat foods that have a lot of fiber, such as fruits, vegetables, prune juice, apple juice and high-fiber cereal. Take a laxative (Miralax, milk of magnesia, Colace, Senna) if you don t move your bowels at least every other day.          Primary Care Provider Office Phone # Fax #    Kiran Painter [phone]       [facility] [address] [place] [address]        Equal Access to Services     [facility] HAILY AH: Alexa Kaufman, melissa cr, suzi jamil. So [facility]  "[phone].    ATENCIÓN: Si tabatha stanford, tiene a bal disposición servicios gratuitos de asistencia lingüística. Barbara theodore [phone].    We comply with applicable federal civil rights laws and [place] laws. We do not discriminate on the basis of race, color, national origin, age, disability, sex, sexual orientation, or gender identity.            Thank you!     Thank you for choosing [facility] AND [facility]  for your care. Our goal is always to provide you with excellent care. Hearing back from our patients is one way we can continue to improve our services. Please take a few minutes to complete the written survey that you may receive in the mail after your visit with us. Thank you!             Your Updated Medication List - Protect others around you: Learn how to safely use, store and throw away your medicines at www.disposemymeds.org.          This list is accurate as of 7/9/18 11:59 PM.  Always use your most recent med list.                   Brand Name Dispense Instructions for use Diagnosis    allopurinol 300 MG tablet    ZYLOPRIM    90 tablet    Take 1 tablet (300 mg) by mouth daily    Acute gout, unspecified cause, unspecified site       Catheters Misc      As directed. 12-18FR, 13.2\", For home use, use 5x/day, 150 per month        CYANOCOBALAMIN PO      Take 1,000 mcg by mouth daily        cyclobenzaprine 10 MG tablet    FLEXERIL    30 tablet    Take 1 tablet (10 mg) by mouth 3 times daily as needed for muscle spasms    Muscle spasm       FISH OIL PO      Take 1 capsule by mouth        HYDROcodone-acetaminophen 5-325 MG ED starter pack    NORCO    1 tablet    Take 1 tablet by mouth once for 1 dose        methotrexate 2.5 MG tablet CHEMO           MULTI-VITAMINS Tabs      Take 1 tablet by mouth daily        naproxen 500 MG tablet    NAPROSYN     Take 500 mg by mouth as needed        nitroFURantoin (macrocrystal-monohydrate) 100 MG capsule    MACROBID    14 capsule    Take 1 capsule (100 mg) by " mouth 2 times daily    Dysuria       triamcinolone 0.1 % ointment    KENALOG     Apply 1 Film topically as needed        VITAMIN D-1000 MAX ST 1000 units Tabs   Generic drug:  cholecalciferol      Take 1,000 Units by mouth        XARELTO 10 MG Tabs tablet   Generic drug:  rivaroxaban ANTICOAGULANT      Take by mouth daily (with dinner)

## 2018-07-10 NOTE — PROGRESS NOTES
"Face-to-face visit for home physical therapy  SUBJECTIVE:   Saurabh Cabrera is a 73 year old male who presents to clinic today for the following health issues: To recovery health.    HPI Comments: Patient's main purpose is have a face-to-face visit for home physical therapy.  He recently had a closed fracture of left tibia fibula and bimalleolar fracture of the right ankle.  He has completed nursing home.  And is now going home.  He is in need of a order for aneurysm repair he has had to cath himself.  Continuing home physical therapy.  Also since making the appointment patient reports increasing dysuria.  Since a he does this periodically.  He reports increasing dysuria frequency.  Recently completed an antibiotic for a UTI.  Patient also would like refills for muscle spasms.        Patient Active Problem List    Diagnosis Date Noted     Dysuria 07/09/2018     Priority: Medium     Urinary retention self cath 06/15/2018     Priority: Medium     Status post bariatric surgery 06/15/2018     Priority: Medium     Status post total bilateral knee replacement 06/15/2018     Priority: Medium     Closed fracture of left tibia and fibula with routine healing 06/15/2018     Priority: Medium     Closed bimalleolar fracture of right ankle with routine healing 06/15/2018     Priority: Medium     Nonruptured cerebral aneurysm status post repair 06/15/2018     Priority: Medium     Weakness of both lower extremities secondary to aneurysm repair 06/15/2018     Priority: Medium     Urinary frequency 06/14/2018     Priority: Medium     Muscle spasm 06/14/2018     Priority: Medium     No past medical history on file.   No past surgical history on file.  Social History     Social History Narrative     Current Outpatient Prescriptions   Medication Sig Dispense Refill     allopurinol (ZYLOPRIM) 300 MG tablet Take 1 tablet (300 mg) by mouth daily 90 tablet 3     Catheters [facility] As directed. 12-18FR, 13.2\", For home use, use 5x/day, 150 " per month       cholecalciferol (VITAMIN D-1000 MAX ST) 1000 units TABS Take 1,000 Units by mouth       CYANOCOBALAMIN PO Take 1,000 mcg by mouth daily       cyclobenzaprine (FLEXERIL) 10 MG tablet Take 1 tablet (10 mg) by mouth 3 times daily as needed for muscle spasms 30 tablet 11     HYDROcodone-acetaminophen (NORCO) 5-325 MG ED starter pack Take 1 tablet by mouth once for 1 dose 1 tablet 0     methotrexate 2.5 MG tablet CHEMO        Multiple Vitamin (MULTI-VITAMINS) TABS Take 1 tablet by mouth daily       naproxen (NAPROSYN) 500 MG tablet Take 500 mg by mouth as needed       nitroFURantoin, macrocrystal-monohydrate, (MACROBID) 100 MG capsule Take 1 capsule (100 mg) by mouth 2 times daily 14 capsule 0     Omega-3 Fatty Acids (FISH OIL PO) Take 1 capsule by mouth       rivaroxaban ANTICOAGULANT (XARELTO) 10 MG TABS tablet Take by mouth daily (with dinner)       triamcinolone (KENALOG) 0.1 % ointment Apply 1 Film topically as needed       Allergies   Allergen Reactions     Atorvastatin      DIARRHEA     Ci Pigment Blue 63      Other reaction(s): Other - Describe In Comment Field  Dye from MRI's causes weakness.  Also, Needs Low Dose from MRI's  r/t clamped off vein in neck.      Ciprofloxacin      Other reaction(s): Other - Describe In Comment Field  Weakness in muscles.     Gadoteridol Nausea and Vomiting     Experienced nausea/vomiting after injection of Prohance during his MRI at the [facility] on 8/7/13     Rofecoxib      MEMORY LOSS       Review of Systems     OBJECTIVE:     /64 (BP Location: Right arm, Patient Position: Sitting)  Pulse 84  There is no height or weight on file to calculate BMI.  Physical Exam   Constitutional:   Patient is in a wheelchair.   HENT:   Head: Normocephalic.   Cardiovascular: Normal rate, regular rhythm and normal heart sounds.    Pulmonary/Chest: Effort normal and breath sounds normal.   Abdominal: Soft.   Skin: Skin is warm.       Diagnostic Test Results:  Results for  orders placed or performed in visit on 07/09/18   Urinalysis w Reflex Microscopic If Positive   Result Value Ref Range    Color Urine Yellow     Appearance Urine Cloudy     Glucose Urine Negative NEG^Negative mg/dL    Bilirubin Urine Negative NEG^Negative    Ketones Urine Trace (A) NEG^Negative mg/dL    Specific Gravity Urine >1.030 1.003 - 1.035    Blood Urine Trace (A) NEG^Negative    pH Urine 5.5 5.0 - 7.0 pH    Protein Albumin Urine Trace (A) NEG^Negative mg/dL    Urobilinogen Urine 0.2 0.2 - 1.0 EU/dL    Nitrite Urine Positive (A) NEG^Negative    Leukocyte Esterase Urine Large (A) NEG^Negative    Source Midstream Urine    Urine Microscopic   Result Value Ref Range    WBC Urine >100 (A) OTO5^0 - 5 /HPF    RBC Urine O - 2 OTO2^O - 2 /HPF    Squamous Epithelial /LPF Urine Few FEW^Few /LPF    Bacteria Urine Many (A) NEG^Negative /HPF    Mucous Urine Present (A) NEG^Negative /LPF   Urine Culture Aerobic Bacterial   Result Value Ref Range    Specimen Description Midstream Urine     Culture Micro (A)      50,000 to 100,000 colonies/mL  Lactose fermenting gram negative rods         ASSESSMENT/PLAN:         1. Muscle spasm  Refill  - cyclobenzaprine (FLEXERIL) 10 MG tablet; Take 1 tablet (10 mg) by mouth 3 times daily as needed for muscle spasms  Dispense: 30 tablet; Refill: 11    2. Acute gout, unspecified cause, unspecified site  Refill doing well.  - allopurinol (ZYLOPRIM) 300 MG tablet; Take 1 tablet (300 mg) by mouth daily  Dispense: 90 tablet; Refill: 3    3. Urinary retention  Positive UA.  Will start the patient on Macrobid.  Cultures are done.  - Urinalysis w Reflex Microscopic If Positive  - Urine Culture Aerobic Bacterial  - Urine Microscopic    4. Dysuria    - nitroFURantoin, macrocrystal-monohydrate, (MACROBID) 100 MG capsule; Take 1 capsule (100 mg) by mouth 2 times daily  Dispense: 14 capsule; Refill: 0    5. Closed fracture of left tibia and fibula with routine healing  .Continue with physical therapy at  home    6. Closed bimalleolar fracture of right ankle with routine healing  Continue with physical therapy.  Forms are signed from [facility].*        Joselo Marley MD  [facility] AND [facility]

## 2018-07-11 LAB
BACTERIA SPEC CULT: ABNORMAL
SPECIMEN SOURCE: ABNORMAL

## 2018-07-16 DIAGNOSIS — S82.841D CLOSED BIMALLEOLAR FRACTURE OF RIGHT ANKLE WITH ROUTINE HEALING: ICD-10-CM

## 2018-07-16 DIAGNOSIS — S82.202D CLOSED FRACTURE OF SHAFT OF LEFT TIBIA WITH ROUTINE HEALING, UNSPECIFIED FRACTURE MORPHOLOGY, SUBSEQUENT ENCOUNTER: Primary | ICD-10-CM

## 2018-07-16 DIAGNOSIS — S82.402D CLOSED FRACTURE OF LEFT TIBIA AND FIBULA WITH ROUTINE HEALING: Primary | ICD-10-CM

## 2018-07-16 DIAGNOSIS — Z53.9 ERRONEOUS ENCOUNTER--DISREGARD: ICD-10-CM

## 2018-07-16 DIAGNOSIS — S82.202D CLOSED FRACTURE OF LEFT TIBIA AND FIBULA WITH ROUTINE HEALING: Primary | ICD-10-CM

## 2018-07-16 NOTE — PROGRESS NOTES
Home health aid services  Received home health care certification July 5 - September 2.  Patient has a history of left tibia fracture.  Is in need of home care services.  Form is reviewed.  Patient is qualified for home care.

## 2018-07-26 ENCOUNTER — TRANSFERRED RECORDS (OUTPATIENT)
Dept: HEALTH INFORMATION MANAGEMENT | Facility: OTHER | Age: 73
End: 2018-07-26

## 2018-08-08 ENCOUNTER — OFFICE VISIT (OUTPATIENT)
Dept: FAMILY MEDICINE | Facility: OTHER | Age: 73
End: 2018-08-08
Attending: FAMILY MEDICINE
Payer: COMMERCIAL

## 2018-08-08 VITALS — HEART RATE: 88 BPM | SYSTOLIC BLOOD PRESSURE: 128 MMHG | DIASTOLIC BLOOD PRESSURE: 84 MMHG

## 2018-08-08 DIAGNOSIS — A49.01 STAPH AUREUS INFECTION: Primary | ICD-10-CM

## 2018-08-08 PROBLEM — R30.0 DYSURIA: Status: RESOLVED | Noted: 2018-07-09 | Resolved: 2018-08-08

## 2018-08-08 PROCEDURE — G0463 HOSPITAL OUTPT CLINIC VISIT: HCPCS

## 2018-08-08 PROCEDURE — 87070 CULTURE OTHR SPECIMN AEROBIC: CPT | Performed by: FAMILY MEDICINE

## 2018-08-08 PROCEDURE — 99213 OFFICE O/P EST LOW 20 MIN: CPT | Performed by: FAMILY MEDICINE

## 2018-08-08 RX ORDER — SULFAMETHOXAZOLE/TRIMETHOPRIM 800-160 MG
1 TABLET ORAL 2 TIMES DAILY
Qty: 14 TABLET | Refills: 0 | Status: CANCELLED | OUTPATIENT
Start: 2018-08-08

## 2018-08-08 RX ORDER — DOXYCYCLINE 100 MG/1
100 CAPSULE ORAL 2 TIMES DAILY
Qty: 14 CAPSULE | Refills: 0 | Status: SHIPPED | OUTPATIENT
Start: 2018-08-08

## 2018-08-08 ASSESSMENT — PAIN SCALES - GENERAL: PAINLEVEL: NO PAIN (0)

## 2018-08-08 NOTE — MR AVS SNAPSHOT
"              After Visit Summary   2018    Saurabh Cabrera    MRN: [id number]           Patient Information     Date Of Birth          1945        Visit Information        Provider Department      2018 12:45 PM Joselo Marley MD [facility]        Today's Diagnoses     Staph aureus infection    -  1       Follow-ups after your visit        Who to contact     If you have questions or need follow up information about today's clinic visit or your schedule please contact [facility] directly at [phone].  Normal or non-critical lab and imaging results will be communicated to you by thePlatformhart, letter or phone within 4 business days after the clinic has received the results. If you do not hear from us within 7 days, please contact the clinic through GuidePalt or phone. If you have a critical or abnormal lab result, we will notify you by phone as soon as possible.  Submit refill requests through Hotelscan or call your pharmacy and they will forward the refill request to us. Please allow 3 business days for your refill to be completed.          Additional Information About Your Visit        MyChart Information     Hotelscan lets you send messages to your doctor, view your test results, renew your prescriptions, schedule appointments and more. To sign up, go to [email or web address] . Click on \"Log in\" on the left side of the screen, which will take you to the Welcome page. Then click on \"Sign up Now\" on the right side of the page.     You will be asked to enter the access code listed below, as well as some personal information. Please follow the directions to create your username and password.     Your access code is: [email or web address]  Expires: 2018  8:02 AM     Your access code will  in 90 days. If you need help or a new code, please call your [facility] or [phone].        Care EveryWhere ID     This is your Care EveryWhere ID. This could be used " by other organizations to access your [facility] medical records  [id number]        Your Vitals Were     Pulse                   88            Blood Pressure from Last 3 Encounters:   08/08/18 128/84   07/09/18 124/64   06/22/18 118/68    Weight from Last 3 Encounters:   No data found for Wt              We Performed the Following     Wound Culture          Today's Medication Changes          These changes are accurate as of 8/8/18 11:59 PM.  If you have any questions, ask your nurse or doctor.               Start taking these medicines.        Dose/Directions    doxycycline 100 MG capsule   Commonly known as:  VIBRAMYCIN   Used for:  Staph aureus infection   Started by:  Joselo Marley MD        Dose:  100 mg   Take 1 capsule (100 mg) by mouth 2 times daily   Quantity:  14 capsule   Refills:  0         Stop taking these medicines if you haven't already. Please contact your care team if you have questions.     nitroFURantoin (macrocrystal-monohydrate) 100 MG capsule   Commonly known as:  MACROBID   Stopped by:  Joselo Marley MD                Where to get your medicines      These medications were sent to [facility] [address], [place] - [address] AT [facility] [address]  [address], [facility] [place] [address]     Phone:  [phone]     doxycycline 100 MG capsule                Primary Care Provider Office Phone # Fax #    Kiran Inocencio [phone]       [facility] [address] [place] [address]        Equal Access to Services     [facility]: Hadii janna ku hadasho Sociara, waaxda luqadaha, qaybta kaalmada adeegyada, waxalize tobi gibbons adelázaro robles. So [facility] [phone].    ATENCIÓN: Si habla español, tiene a bal disposición servicios gratuitos de asistencia lingüística. Llame al [phone].    We comply with applicable federal civil rights laws and [place] laws. We do not discriminate on the basis of race, color, national origin, age,  "disability, sex, sexual orientation, or gender identity.            Thank you!     Thank you for choosing [facility] AND [facility]  for your care. Our goal is always to provide you with excellent care. Hearing back from our patients is one way we can continue to improve our services. Please take a few minutes to complete the written survey that you may receive in the mail after your visit with us. Thank you!             Your Updated Medication List - Protect others around you: Learn how to safely use, store and throw away your medicines at www.disposemymeds.org.          This list is accurate as of 8/8/18 11:59 PM.  Always use your most recent med list.                   Brand Name Dispense Instructions for use Diagnosis    allopurinol 300 MG tablet    ZYLOPRIM    90 tablet    Take 1 tablet (300 mg) by mouth daily    Acute gout, unspecified cause, unspecified site       Catheters Misc      As directed. 12-18FR, 13.2\", For home use, use 5x/day, 150 per month        CYANOCOBALAMIN PO      Take 1,000 mcg by mouth daily        cyclobenzaprine 10 MG tablet    FLEXERIL    30 tablet    Take 1 tablet (10 mg) by mouth 3 times daily as needed for muscle spasms    Muscle spasm       doxycycline 100 MG capsule    VIBRAMYCIN    14 capsule    Take 1 capsule (100 mg) by mouth 2 times daily    Staph aureus infection       FISH OIL PO      Take 1 capsule by mouth        HYDROcodone-acetaminophen 5-325 MG ED starter pack    NORCO    1 tablet    Take 1 tablet by mouth once for 1 dose        methotrexate 2.5 MG tablet CHEMO           MULTI-VITAMINS Tabs      Take 1 tablet by mouth daily        naproxen 500 MG tablet    NAPROSYN     Take 500 mg by mouth as needed        triamcinolone 0.1 % ointment    KENALOG     Apply 1 Film topically as needed        VITAMIN D-1000 MAX ST 1000 units Tabs   Generic drug:  cholecalciferol      Take 1,000 Units by mouth        XARELTO 10 MG Tabs tablet   Generic drug:  rivaroxaban ANTICOAGULANT "      Take by mouth daily (with dinner)

## 2018-08-08 NOTE — NURSING NOTE
Patient here for bilateral derm problem with both feet. Questioning staph infection. Gout is acting up as well. Tressa Schmitt LPN .......................8/8/2018  12:45 PM

## 2018-08-09 NOTE — PROGRESS NOTES
SUBJECTIVE:   Saurabh Cabrera is a 73 year old male who presents to clinic today for the following health issues: Bilateral feet lesions    HPI Comments: Patient arrives here for bilateral feet lesions.  He states it has become very painful.  He has noticed some lesions on the bottom of his feet.  He is concerned about a staph infection.        Patient Active Problem List    Diagnosis Date Noted     Staph aureus infection 08/08/2018     Priority: Medium     Urinary retention self cath 06/15/2018     Priority: Medium     Status post bariatric surgery 06/15/2018     Priority: Medium     Status post total bilateral knee replacement 06/15/2018     Priority: Medium     Closed fracture of left tibia and fibula with routine healing 06/15/2018     Priority: Medium     Closed bimalleolar fracture of right ankle with routine healing 06/15/2018     Priority: Medium     Nonruptured cerebral aneurysm status post repair 06/15/2018     Priority: Medium     Weakness of both lower extremities secondary to aneurysm repair 06/15/2018     Priority: Medium     Urinary frequency 06/14/2018     Priority: Medium     Muscle spasm 06/14/2018     Priority: Medium     No past medical history on file.   No past surgical history on file.    Review of Systems     OBJECTIVE:     /84 (BP Location: Right arm, Patient Position: Sitting)  Pulse 88  There is no height or weight on file to calculate BMI.  Physical Exam   Constitutional: He appears well-developed.   Skin:   Patient has blisters and pustules along with hemorrhagic vesicles present on both feet.       Diagnostic Test Results:  Results for orders placed or performed in visit on 07/09/18   Urinalysis w Reflex Microscopic If Positive   Result Value Ref Range    Color Urine Yellow     Appearance Urine Cloudy     Glucose Urine Negative NEG^Negative mg/dL    Bilirubin Urine Negative NEG^Negative    Ketones Urine Trace (A) NEG^Negative mg/dL    Specific Gravity Urine >1.030 1.003 -  1.035    Blood Urine Trace (A) NEG^Negative    pH Urine 5.5 5.0 - 7.0 pH    Protein Albumin Urine Trace (A) NEG^Negative mg/dL    Urobilinogen Urine 0.2 0.2 - 1.0 EU/dL    Nitrite Urine Positive (A) NEG^Negative    Leukocyte Esterase Urine Large (A) NEG^Negative    Source Midstream Urine    Urine Microscopic   Result Value Ref Range    WBC Urine >100 (A) OTO5^0 - 5 /HPF    RBC Urine O - 2 OTO2^O - 2 /HPF    Squamous Epithelial /LPF Urine Few FEW^Few /LPF    Bacteria Urine Many (A) NEG^Negative /HPF    Mucous Urine Present (A) NEG^Negative /LPF   Urine Culture Aerobic Bacterial   Result Value Ref Range    Specimen Description Midstream Urine     Culture Micro (A)      50,000 to 100,000 colonies/mL  Klebsiella pneumoniae         Susceptibility    Klebsiella pneumoniae - ALYSA     AMPICILLIN* >16 Resistant ug/mL      * Intrinsically Resistant     AZTREONAM <=8 Sensitive ug/mL     CEFEPIME <=8 Sensitive ug/mL     CEFTRIAXONE <=8 Sensitive ug/mL     CEFTAZIDIME <=1 Sensitive ug/mL     CEFOTAXIME <=2 Sensitive ug/mL     CIPROFLOXACIN <=1 Sensitive ug/mL     CEFUROXIME <=4 Sensitive ug/mL     NITROFURANTOIN <=32 Sensitive ug/mL     GENTAMICIN <=4 Sensitive ug/mL     IMIPENEM <=1 Sensitive ug/mL     LEVOFLOXACIN <=2 Sensitive ug/mL     Piperacillin/Tazo <=16 Sensitive ug/mL     Trimethoprim/Sulfa <=2/38 Sensitive ug/mL     TETRACYCLINE <=4 Sensitive ug/mL       ASSESSMENT/PLAN:           ICD-10-CM    1. Staph aureus infection A49.01 Wound Culture     doxycycline (VIBRAMYCIN) 100 MG capsule         Joselo Marley MD  [facility]

## 2018-08-10 ENCOUNTER — TELEPHONE (OUTPATIENT)
Dept: FAMILY MEDICINE | Facility: OTHER | Age: 73
End: 2018-08-10

## 2018-08-10 LAB
BACTERIA SPEC CULT: ABNORMAL
SPECIMEN SOURCE: ABNORMAL

## 2018-08-10 NOTE — TELEPHONE ENCOUNTER
Let saray know that wound culture is not back as of yet. Tressa Schmitt LPN .......................8/10/2018  9:22 AM

## 2018-08-13 NOTE — TELEPHONE ENCOUNTER
Spoke with patient his wound was draining and is now clearing up and healing up. Please review normal culture, should patient continue with antibiotics. Tressa Schmitt LPN .......................8/13/2018  9:05 AM

## 2018-08-13 NOTE — TELEPHONE ENCOUNTER
Patient notified and his foot is not getting better he will follow up on Wednesday afternoon to have it checked.  Tressa Schmitt LPN     8/13/2018 2:19 PM

## 2018-08-15 ENCOUNTER — OFFICE VISIT (OUTPATIENT)
Dept: FAMILY MEDICINE | Facility: OTHER | Age: 73
End: 2018-08-15
Attending: FAMILY MEDICINE
Payer: COMMERCIAL

## 2018-08-15 VITALS — TEMPERATURE: 97.6 F | SYSTOLIC BLOOD PRESSURE: 144 MMHG | DIASTOLIC BLOOD PRESSURE: 84 MMHG

## 2018-08-15 DIAGNOSIS — L08.9 SKIN PUSTULE: Primary | ICD-10-CM

## 2018-08-15 DIAGNOSIS — L85.3 DRY SKIN: ICD-10-CM

## 2018-08-15 PROCEDURE — 99213 OFFICE O/P EST LOW 20 MIN: CPT | Performed by: FAMILY MEDICINE

## 2018-08-15 PROCEDURE — G0463 HOSPITAL OUTPT CLINIC VISIT: HCPCS

## 2018-08-15 ASSESSMENT — PAIN SCALES - GENERAL: PAINLEVEL: NO PAIN (0)

## 2018-08-15 NOTE — NURSING NOTE
Patient here to have the right foot sore checked has been having yellow drainage. The left foot has a sore as well to be checked. Tressa Schmitt LPN .......................8/15/2018  2:11 PM

## 2018-08-15 NOTE — MR AVS SNAPSHOT
"              After Visit Summary   8/15/2018    Saurabh Cabrera    MRN: [id number]           Patient Information     Date Of Birth          1945        Visit Information        Provider Department      8/15/2018 2:00 PM Joselo Marley MD [facility]        Today's Diagnoses     Skin pustule    -  1    Dry skin           Follow-ups after your visit        Who to contact     If you have questions or need follow up information about today's clinic visit or your schedule please contact [facility] directly at [phone].  Normal or non-critical lab and imaging results will be communicated to you by Enphase Energyhart, letter or phone within 4 business days after the clinic has received the results. If you do not hear from us within 7 days, please contact the clinic through Evincet or phone. If you have a critical or abnormal lab result, we will notify you by phone as soon as possible.  Submit refill requests through RedOak Logic or call your pharmacy and they will forward the refill request to us. Please allow 3 business days for your refill to be completed.          Additional Information About Your Visit        MyChart Information     RedOak Logic lets you send messages to your doctor, view your test results, renew your prescriptions, schedule appointments and more. To sign up, go to [email or web address] . Click on \"Log in\" on the left side of the screen, which will take you to the Welcome page. Then click on \"Sign up Now\" on the right side of the page.     You will be asked to enter the access code listed below, as well as some personal information. Please follow the directions to create your username and password.     Your access code is: [email or web address]  Expires: 2018  8:02 AM     Your access code will  in 90 days. If you need help or a new code, please call your [facility] or [phone].        Care EveryWhere ID     This is your Care EveryWhere ID. This could be " used by other organizations to access your [facility] medical records  [id number]        Your Vitals Were     Temperature                   97.6  F (36.4  C)            Blood Pressure from Last 3 Encounters:   08/15/18 144/84   08/08/18 128/84   07/09/18 124/64    Weight from Last 3 Encounters:   No data found for Wt              Today, you had the following     No orders found for display         Today's Medication Changes          These changes are accurate as of 8/15/18 11:59 PM.  If you have any questions, ask your nurse or doctor.               These medicines have changed or have updated prescriptions.        Dose/Directions    triamcinolone 0.1 % ointment   Commonly known as:  KENALOG   This may have changed:  how much to take   Used for:  Dry skin   Changed by:  Joselo Marley MD        Apply topically as needed   Quantity:  30 g   Refills:  1         Stop taking these medicines if you haven't already. Please contact your care team if you have questions.     FISH OIL PO   Stopped by:  Joselo Marley MD                Where to get your medicines      These medications were sent to [facility] [address] [facility], [place] - [address] AT [facility] of [address]  [address], [facility] [address]     Phone:  [phone]     triamcinolone 0.1 % ointment                Primary Care Provider Office Phone # Fax #    Kiran Inocencio [phone]       [facility] [address] [place] [address]        Equal Access to Services     [facility] HAILY : Hadii aad ku hadasho Soomaali, waaxda luqadaha, qaybta kaalmada adeegyada, waxay tobi saxena . So [facility] [phone].    ATENCIÓN: Si habla español, tiene a bal disposición servicios gratuitos de asistencia lingüística. Llmillicent al [phone].    We comply with applicable federal civil rights laws and [place] laws. We do not discriminate on the basis of race, color, national origin, age, disability, sex,  "sexual orientation, or gender identity.            Thank you!     Thank you for choosing [facility] AND [facility]  for your care. Our goal is always to provide you with excellent care. Hearing back from our patients is one way we can continue to improve our services. Please take a few minutes to complete the written survey that you may receive in the mail after your visit with us. Thank you!             Your Updated Medication List - Protect others around you: Learn how to safely use, store and throw away your medicines at www.disposemymeds.org.          This list is accurate as of 8/15/18 11:59 PM.  Always use your most recent med list.                   Brand Name Dispense Instructions for use Diagnosis    allopurinol 300 MG tablet    ZYLOPRIM    90 tablet    Take 1 tablet (300 mg) by mouth daily    Acute gout, unspecified cause, unspecified site       Catheters Misc      As directed. 12-18FR, 13.2\", For home use, use 5x/day, 150 per month        CYANOCOBALAMIN PO      Take 1,000 mcg by mouth daily        cyclobenzaprine 10 MG tablet    FLEXERIL    30 tablet    Take 1 tablet (10 mg) by mouth 3 times daily as needed for muscle spasms    Muscle spasm       doxycycline 100 MG capsule    VIBRAMYCIN    14 capsule    Take 1 capsule (100 mg) by mouth 2 times daily    Staph aureus infection       HYDROcodone-acetaminophen 5-325 MG ED starter pack    NORCO    1 tablet    Take 1 tablet by mouth once for 1 dose        methotrexate 2.5 MG tablet CHEMO           MULTI-VITAMINS Tabs      Take 1 tablet by mouth daily        naproxen 500 MG tablet    NAPROSYN     Take 500 mg by mouth as needed        triamcinolone 0.1 % ointment    KENALOG    30 g    Apply topically as needed    Dry skin       VITAMIN D-1000 MAX ST 1000 units Tabs   Generic drug:  cholecalciferol      Take 1,000 Units by mouth        XARELTO 10 MG Tabs tablet   Generic drug:  rivaroxaban ANTICOAGULANT      Take by mouth daily (with dinner)          "

## 2018-08-16 PROBLEM — L85.3 DRY SKIN: Status: ACTIVE | Noted: 2018-08-16

## 2018-08-16 PROBLEM — L08.9 SKIN PUSTULE: Status: ACTIVE | Noted: 2018-08-16

## 2018-08-16 PROBLEM — A49.01 STAPH AUREUS INFECTION: Status: RESOLVED | Noted: 2018-08-08 | Resolved: 2018-08-16

## 2018-08-16 RX ORDER — TRIAMCINOLONE ACETONIDE 1 MG/G
OINTMENT TOPICAL PRN
Qty: 30 G | Refills: 1 | Status: SHIPPED | OUTPATIENT
Start: 2018-08-16

## 2018-08-16 RX ORDER — SILVER SULFADIAZINE 10 MG/G
CREAM TOPICAL DAILY
Status: ACTIVE | OUTPATIENT
Start: 2018-08-16 | End: 2019-08-16

## 2018-08-16 NOTE — PROGRESS NOTES
SUBJECTIVE:   Saurabh Cabrera is a 73 year old male who presents to clinic today for the following health issues: Follow-up    HPI Comments: Patient arrives for follow-up on lower foot lesions.  He was recently seen and was found to have a vesicle on both feet.  The left one seems to be healing right one seems to be more white and has a discharge.        Patient Active Problem List    Diagnosis Date Noted     Staph aureus infection 08/08/2018     Priority: Medium     Urinary retention self cath 06/15/2018     Priority: Medium     Status post bariatric surgery 06/15/2018     Priority: Medium     Status post total bilateral knee replacement 06/15/2018     Priority: Medium     Closed fracture of left tibia and fibula with routine healing 06/15/2018     Priority: Medium     Closed bimalleolar fracture of right ankle with routine healing 06/15/2018     Priority: Medium     Nonruptured cerebral aneurysm status post repair 06/15/2018     Priority: Medium     Weakness of both lower extremities secondary to aneurysm repair 06/15/2018     Priority: Medium     Urinary frequency 06/14/2018     Priority: Medium     Muscle spasm 06/14/2018     Priority: Medium     No past medical history on file.   No past surgical history on file.  Allergies   Allergen Reactions     Atorvastatin      DIARRHEA     Ci Pigment Blue 63      Other reaction(s): Other - Describe In Comment Field  Dye from MRI's causes weakness.  Also, Needs Low Dose from MRI's  r/t clamped off vein in neck.      Ciprofloxacin      Other reaction(s): Other - Describe In Comment Field  Weakness in muscles.     Gadoteridol Nausea and Vomiting     Experienced nausea/vomiting after injection of Prohance during his MRI at the [facility] on 8/7/13     Rofecoxib      MEMORY LOSS       Review of Systems     OBJECTIVE:     /84 (BP Location: Right arm, Patient Position: Sitting)  Temp 97.6  F (36.4  C)  There is no height or weight on file to calculate BMI.  Physical  Exam   HENT:   Head: Normocephalic.   Neurological: He is alert.   Skin:   There appears to be a resolving vesicle on the right lower foot.  The skin was deroofed showing good normal tissue underneath.  Some old dried pus present also.  Some localized redness consistent with healing and not infection       none     ASSESSMENT/PLAN:         1. Skin pustule  Versus resolving staph infection.  After the old skin was removed shows good healthy skin on the bottom of his feet.  Reassurance is given to the patient.  Will apply Silvadene for the next 3-4 days.  Follow-up as needed        Joselo Marley MD  [facility]

## 2018-08-27 ENCOUNTER — TELEPHONE (OUTPATIENT)
Dept: FAMILY MEDICINE | Facility: OTHER | Age: 73
End: 2018-08-27

## 2018-08-27 NOTE — TELEPHONE ENCOUNTER
[facility] calling. Looking for verbal orders to restart skilled nursing services 1x a week for 4 weeks.

## 2018-08-29 ENCOUNTER — TELEPHONE (OUTPATIENT)
Dept: FAMILY MEDICINE | Facility: OTHER | Age: 73
End: 2018-08-29

## 2018-08-30 ENCOUNTER — TELEPHONE (OUTPATIENT)
Dept: FAMILY MEDICINE | Facility: OTHER | Age: 73
End: 2018-08-30

## 2018-08-30 NOTE — TELEPHONE ENCOUNTER
Spoke with Kimmy let her know that the veral ok was done on 08/27/18 then the written was signed and already faxed back. Tressa Schmitt LPN .......................8/30/2018  10:54 AM

## 2018-09-03 DIAGNOSIS — R29.898 WEAKNESS OF BOTH LOWER EXTREMITIES: ICD-10-CM

## 2018-09-03 DIAGNOSIS — Z96.653 STATUS POST TOTAL BILATERAL KNEE REPLACEMENT: Primary | ICD-10-CM

## 2018-09-26 ENCOUNTER — TRANSFERRED RECORDS (OUTPATIENT)
Dept: HEALTH INFORMATION MANAGEMENT | Facility: OTHER | Age: 73
End: 2018-09-26

## 2018-10-05 ENCOUNTER — TELEPHONE (OUTPATIENT)
Dept: FAMILY MEDICINE | Facility: OTHER | Age: 73
End: 2018-10-05

## 2018-10-05 NOTE — TELEPHONE ENCOUNTER
[facility] is looking for a fax they sent over on 9/27. They would like an order for 8 additional PT sessions effective 10/1.

## 2019-04-29 ENCOUNTER — RECORDS - HEALTHEAST (OUTPATIENT)
Dept: LAB | Facility: CLINIC | Age: 74
End: 2019-04-29

## 2019-04-29 LAB
ANION GAP SERPL CALCULATED.3IONS-SCNC: 10 MMOL/L (ref 5–18)
BUN SERPL-MCNC: 10 MG/DL (ref 8–28)
CALCIUM SERPL-MCNC: 8.4 MG/DL (ref 8.5–10.5)
CHLORIDE BLD-SCNC: 105 MMOL/L (ref 98–107)
CO2 SERPL-SCNC: 26 MMOL/L (ref 22–31)
CREAT SERPL-MCNC: 0.63 MG/DL (ref 0.7–1.3)
ERYTHROCYTE [DISTWIDTH] IN BLOOD BY AUTOMATED COUNT: 14.3 % (ref 11–14.5)
GFR SERPL CREATININE-BSD FRML MDRD: >60 ML/MIN/1.73M2
GLUCOSE BLD-MCNC: 79 MG/DL (ref 70–125)
HCT VFR BLD AUTO: 40.6 % (ref 40–54)
HGB BLD-MCNC: 13.3 G/DL (ref 14–18)
MCH RBC QN AUTO: 30.5 PG (ref 27–34)
MCHC RBC AUTO-ENTMCNC: 32.8 G/DL (ref 32–36)
MCV RBC AUTO: 93 FL (ref 80–100)
PLATELET # BLD AUTO: 223 THOU/UL (ref 140–440)
PMV BLD AUTO: 11.3 FL (ref 8.5–12.5)
POTASSIUM BLD-SCNC: 4.5 MMOL/L (ref 3.5–5)
RBC # BLD AUTO: 4.36 MILL/UL (ref 4.4–6.2)
SODIUM SERPL-SCNC: 141 MMOL/L (ref 136–145)
URATE SERPL-MCNC: 4.2 MG/DL (ref 3–8)
WBC: 5.9 THOU/UL (ref 4–11)

## 2019-05-05 ENCOUNTER — RECORDS - HEALTHEAST (OUTPATIENT)
Dept: LAB | Facility: CLINIC | Age: 74
End: 2019-05-05

## 2019-05-05 LAB
ALBUMIN UR-MCNC: ABNORMAL MG/DL
AMORPH CRY #/AREA URNS HPF: ABNORMAL /[HPF]
APPEARANCE UR: ABNORMAL
BACTERIA #/AREA URNS HPF: ABNORMAL HPF
BILIRUB UR QL STRIP: NEGATIVE
COLOR UR AUTO: YELLOW
GLUCOSE UR STRIP-MCNC: NEGATIVE MG/DL
HGB UR QL STRIP: ABNORMAL
KETONES UR STRIP-MCNC: NEGATIVE MG/DL
LEUKOCYTE ESTERASE UR QL STRIP: ABNORMAL
MUCOUS THREADS #/AREA URNS LPF: ABNORMAL LPF
NITRATE UR QL: NEGATIVE
PH UR STRIP: 6 [PH] (ref 4.5–8)
RBC #/AREA URNS AUTO: ABNORMAL HPF
RENAL EPI CELLS #/AREA URNS HPF: ABNORMAL LPF
SP GR UR STRIP: 1.01 (ref 1–1.03)
SQUAMOUS #/AREA URNS AUTO: ABNORMAL LPF
UROBILINOGEN UR STRIP-ACNC: ABNORMAL
WBC #/AREA URNS AUTO: ABNORMAL HPF
WBC CLUMPS #/AREA URNS HPF: PRESENT /[HPF]

## 2019-05-06 ENCOUNTER — RECORDS - HEALTHEAST (OUTPATIENT)
Dept: LAB | Facility: CLINIC | Age: 74
End: 2019-05-06

## 2019-05-06 LAB
ANION GAP SERPL CALCULATED.3IONS-SCNC: 14 MMOL/L (ref 5–18)
BASOPHILS # BLD AUTO: 0.1 THOU/UL (ref 0–0.2)
BASOPHILS NFR BLD AUTO: 1 % (ref 0–2)
BUN SERPL-MCNC: 10 MG/DL (ref 8–28)
CALCIUM SERPL-MCNC: 8.6 MG/DL (ref 8.5–10.5)
CHLORIDE BLD-SCNC: 103 MMOL/L (ref 98–107)
CO2 SERPL-SCNC: 21 MMOL/L (ref 22–31)
CREAT SERPL-MCNC: 0.71 MG/DL (ref 0.7–1.3)
EOSINOPHIL # BLD AUTO: 0.3 THOU/UL (ref 0–0.4)
EOSINOPHIL NFR BLD AUTO: 2 % (ref 0–6)
ERYTHROCYTE [DISTWIDTH] IN BLOOD BY AUTOMATED COUNT: 14.2 % (ref 11–14.5)
GFR SERPL CREATININE-BSD FRML MDRD: >60 ML/MIN/1.73M2
GLUCOSE BLD-MCNC: 172 MG/DL (ref 70–125)
HCT VFR BLD AUTO: 40.9 % (ref 40–54)
HGB BLD-MCNC: 13.5 G/DL (ref 14–18)
LYMPHOCYTES # BLD AUTO: 1.6 THOU/UL (ref 0.8–4.4)
LYMPHOCYTES NFR BLD AUTO: 13 % (ref 20–40)
MCH RBC QN AUTO: 30.5 PG (ref 27–34)
MCHC RBC AUTO-ENTMCNC: 33 G/DL (ref 32–36)
MCV RBC AUTO: 93 FL (ref 80–100)
MONOCYTES # BLD AUTO: 0.5 THOU/UL (ref 0–0.9)
MONOCYTES NFR BLD AUTO: 4 % (ref 2–10)
NEUTROPHILS # BLD AUTO: 10 THOU/UL (ref 2–7.7)
NEUTROPHILS NFR BLD AUTO: 81 % (ref 50–70)
PLATELET # BLD AUTO: 360 THOU/UL (ref 140–440)
PMV BLD AUTO: 10.8 FL (ref 8.5–12.5)
POTASSIUM BLD-SCNC: 4.8 MMOL/L (ref 3.5–5)
RBC # BLD AUTO: 4.42 MILL/UL (ref 4.4–6.2)
SODIUM SERPL-SCNC: 138 MMOL/L (ref 136–145)
WBC: 12.5 THOU/UL (ref 4–11)

## 2019-05-08 LAB — BACTERIA SPEC CULT: ABNORMAL

## 2023-01-10 NOTE — TELEPHONE ENCOUNTER
Per Rolando at [facility], Biotin chewable is not available.  Please change to either regular tabs or rapid dissolve tabs.  Original order from [facility] is:  Biotin 1000 mcg tabs  10,000 units every day  I questioned this order because of the amounts and units of measurement, but she states this is what [facility] sent.  Told her that was a questionable order and asked if [facility] was contacted.  She said they were not, the local provider takes care of things now.   Please review order and send new order to [facility].    Ok to wait for Joselo Marley MD tomorrow.   Yes

## 2023-08-25 ENCOUNTER — ANCILLARY PROCEDURE (OUTPATIENT)
Dept: BONE DENSITY | Facility: CLINIC | Age: 78
End: 2023-08-25
Attending: FAMILY MEDICINE
Payer: COMMERCIAL

## 2023-08-25 DIAGNOSIS — G82.20 SPINAL PARAPARESIS (H): ICD-10-CM

## 2023-08-25 DIAGNOSIS — E66.01 MORBID OBESITY (H): ICD-10-CM

## 2023-08-25 DIAGNOSIS — M81.0 AGE-RELATED OSTEOPOROSIS WITHOUT CURRENT PATHOLOGICAL FRACTURE: ICD-10-CM

## 2023-08-25 PROCEDURE — 77080 DXA BONE DENSITY AXIAL: CPT

## (undated) RX ORDER — SILVER SULFADIAZINE 10 MG/G
CREAM TOPICAL
Status: DISPENSED
Start: 2018-08-15